# Patient Record
Sex: FEMALE | Race: OTHER | URBAN - METROPOLITAN AREA
[De-identification: names, ages, dates, MRNs, and addresses within clinical notes are randomized per-mention and may not be internally consistent; named-entity substitution may affect disease eponyms.]

---

## 2021-07-08 ENCOUNTER — OFFICE VISIT (OUTPATIENT)
Dept: PRIMARY CARE CLINIC | Age: 26
End: 2021-07-08
Payer: COMMERCIAL

## 2021-07-08 VITALS
WEIGHT: 177 LBS | OXYGEN SATURATION: 97 % | BODY MASS INDEX: 26.22 KG/M2 | HEIGHT: 69 IN | DIASTOLIC BLOOD PRESSURE: 68 MMHG | SYSTOLIC BLOOD PRESSURE: 122 MMHG | HEART RATE: 77 BPM

## 2021-07-08 DIAGNOSIS — L98.9 FOOT LESION: ICD-10-CM

## 2021-07-08 DIAGNOSIS — R21 RASH AND NONSPECIFIC SKIN ERUPTION: Primary | ICD-10-CM

## 2021-07-08 PROCEDURE — 1036F TOBACCO NON-USER: CPT | Performed by: NURSE PRACTITIONER

## 2021-07-08 PROCEDURE — G8419 CALC BMI OUT NRM PARAM NOF/U: HCPCS | Performed by: NURSE PRACTITIONER

## 2021-07-08 PROCEDURE — G8427 DOCREV CUR MEDS BY ELIG CLIN: HCPCS | Performed by: NURSE PRACTITIONER

## 2021-07-08 PROCEDURE — 99203 OFFICE O/P NEW LOW 30 MIN: CPT | Performed by: NURSE PRACTITIONER

## 2021-07-08 RX ORDER — IBUPROFEN 200 MG
200 TABLET ORAL EVERY 6 HOURS PRN
COMMUNITY

## 2021-07-08 RX ORDER — LEVONORGESTREL / ETHINYL ESTRADIOL AND ETHINYL ESTRADIOL 150-30(84)
KIT ORAL
COMMUNITY
End: 2021-08-23 | Stop reason: SDUPTHER

## 2021-07-08 SDOH — ECONOMIC STABILITY: FOOD INSECURITY: WITHIN THE PAST 12 MONTHS, THE FOOD YOU BOUGHT JUST DIDN'T LAST AND YOU DIDN'T HAVE MONEY TO GET MORE.: NEVER TRUE

## 2021-07-08 SDOH — ECONOMIC STABILITY: FOOD INSECURITY: WITHIN THE PAST 12 MONTHS, YOU WORRIED THAT YOUR FOOD WOULD RUN OUT BEFORE YOU GOT MONEY TO BUY MORE.: NEVER TRUE

## 2021-07-08 ASSESSMENT — PATIENT HEALTH QUESTIONNAIRE - PHQ9
SUM OF ALL RESPONSES TO PHQ QUESTIONS 1-9: 0
SUM OF ALL RESPONSES TO PHQ9 QUESTIONS 1 & 2: 0
1. LITTLE INTEREST OR PLEASURE IN DOING THINGS: 0
SUM OF ALL RESPONSES TO PHQ QUESTIONS 1-9: 0
2. FEELING DOWN, DEPRESSED OR HOPELESS: 0
SUM OF ALL RESPONSES TO PHQ QUESTIONS 1-9: 0

## 2021-07-08 ASSESSMENT — ENCOUNTER SYMPTOMS
SHORTNESS OF BREATH: 0
CHEST TIGHTNESS: 0

## 2021-07-08 ASSESSMENT — SOCIAL DETERMINANTS OF HEALTH (SDOH): HOW HARD IS IT FOR YOU TO PAY FOR THE VERY BASICS LIKE FOOD, HOUSING, MEDICAL CARE, AND HEATING?: NOT HARD AT ALL

## 2021-07-08 NOTE — PROGRESS NOTES
PROGRESS NOTE  Date of Service:  7/8/2021  Address: [address], [address]  Dept: [phone]  Loc: [phone]    Subjective:      Patient ID: <[id number]>  Cindy Lugo is a 27 y.o. female    HPI: The patient presents today to establish primary care. 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 Skin eruption: She developed a dry patch along the corner of her mouth right side approximately 2 weeks ago. She was testing out a new skin product that had a fragrance in it at work on her and shortly after the dry patch appeared. She stopped using the product 1 week later after stopping the product. She has tried CeraVue cream on the area. Cutaneous foot lesion: She has a hard-like callous on the bottom of her right and left foot that has been there since college 2016. She does a lot of standing at work and has developed pain 2 months ago on the left foot. She has tried over-the-counter band-aid wart removal that helped but returned. She has to wear closed-toe shoes while at work and her feet gets sweaty. Review of Systems   Constitutional: Negative for chills, fatigue and fever. Respiratory: Negative for chest tightness and shortness of breath. Cardiovascular: Negative for chest pain and palpitations. Skin: Positive for rash (perioral (right)). All other systems reviewed and are negative. Objective:   Physical Exam  Vitals reviewed. Constitutional:       Appearance: Normal appearance. HENT:      Mouth/Throat:      Comments: Dry non-erythematous patch perioral (right)  Cardiovascular:      Rate and Rhythm: Normal rate and regular rhythm. Pulses: Normal pulses. Heart sounds: Normal heart sounds. Pulmonary:      Effort: Pulmonary effort is normal.      Breath sounds: Normal breath sounds.    Musculoskeletal:        Feet:    Feet:      Right foot: Skin integrity: Callus present. Left foot:      Skin integrity: Callus present. Comments: cutanous skin lesion (bilateral)   Skin:     Capillary Refill: Capillary refill takes less than 2 seconds. Neurological:      General: No focal deficit present. Mental Status: She is alert and oriented to person, place, and time. Psychiatric:         Mood and Affect: Mood normal.         Behavior: Behavior normal.         Thought Content: Thought content normal.         Judgment: Judgment normal.           Plan:   1. Rash and nonspecific skin eruption  I suspect the patient has developed a form of contact dermatitis right side perioral area 2 weeks after using a new skin care product that has been development with her job. She stopped using the skin care product after 1 week of use and has been using CeraVue at the area. It has not worsened or progressed to other areas of the skin. I have recommended 2.5% of hydrocortisone for the area, but the patient would like to hold off for now. I think if she gives it some more time and with continued use of the CeraVue to keep the area moisturized the dermatitis should resolve on its own. 2. Foot lesion  The patient has a cutaneous skin lesion one on each of the bottom of her feet. They have been there since she had been college in 2016 and recently over the past 2 months she has noticed the lesion on her right foot has become more tender to the touch. She has tried over-the-counter wart removal with some success but eventually the skin lesion returned. I suspect the area is more of a callous than a plantar wart based on where the lesions are located on each foot and appears the cause could be pressure related from her shoes. I will send her to Podiatry for evaluation and further recommendation.     - MELBA - Evelio Briggs., DPM, Podiatry, [place]      The patient is to follow up as needed or sooner if needed.                Electronically signed by Magdalena Hogue Jenny Milligan CNP on 7/8/21 at 3:04 PM EDT     This dictation was generated by voice recognition computer software. Although all attempts are made to edit the dictation for accuracy, there may be errors in the transcription that were not intended.

## 2021-08-23 RX ORDER — LEVONORGESTREL / ETHINYL ESTRADIOL AND ETHINYL ESTRADIOL 150-30(84)
1 KIT ORAL DAILY
Qty: 91 TABLET | Refills: 0 | Status: SHIPPED | OUTPATIENT
Start: 2021-08-23 | End: 2021-08-25 | Stop reason: SDUPTHER

## 2021-08-23 NOTE — TELEPHONE ENCOUNTER
Pt called requesting a refill on birth control:    Levonorgest-Eth Estrad 91-Day 0.15-0.03 &0.01 MG TABS       [facility] [address] [place], [place] - [address] [place] [address] [address] [facility] [address] [facility] [address]   Phone:  [phone]  Fax:  [phone]

## 2021-08-23 NOTE — TELEPHONE ENCOUNTER
Medication:   Requested Prescriptions     Pending Prescriptions Disp Refills    Levonorgest-Eth Estrad 91-Day 0.15-0.03 &0.01 MG TABS 91 tablet 0     Sig: Take 1 tablet by mouth daily        Last Filled:  Historically listed    Patient Phone Number: [phone] (home)     Last appt: 7/8/2021   Next appt: Visit date not found    Last OARRS: No flowsheet data found.

## 2021-08-25 DIAGNOSIS — B37.9 YEAST INFECTION: Primary | ICD-10-CM

## 2021-08-25 RX ORDER — LEVONORGESTREL / ETHINYL ESTRADIOL AND ETHINYL ESTRADIOL 150-30(84)
1 KIT ORAL DAILY
Qty: 91 TABLET | Refills: 3 | Status: SHIPPED | OUTPATIENT
Start: 2021-08-25 | End: 2021-11-22 | Stop reason: SDUPTHER

## 2021-08-25 NOTE — TELEPHONE ENCOUNTER
Sent yeast medication to pharmacy if not improved need to see in office.  Please read directions to  medications, also sent in birth control

## 2021-08-25 NOTE — TELEPHONE ENCOUNTER
Call patient regarding phone message from [facility] ([employer]) regarding yeast infection - get more information from patient before routing to provider.

## 2021-08-25 NOTE — TELEPHONE ENCOUNTER
This refill has already been done- I thought I had cancelled the RX refill note? I know nothing about yeast infection?

## 2021-08-25 NOTE — TELEPHONE ENCOUNTER
----- Message from Marisol Baird sent at 8/25/2021  8:59 AM EDT -----  Subject: Appointment Request    Reason for Call: Urgent Adult Urinary Problem    QUESTIONS  Type of Appointment? Established Patient  Reason for appointment request? Available appointments did not meet   patient need  Additional Information for Provider? Pt called to advise she is   experiencing yeast infection symptoms. Offered to schedule appt, pt   preferred message be sent to KARLA Bull to request RX be sent to [facility] in   [place] if possible.  ---------------------------------------------------------------------------  --------------  [address]  What is the best way for the office to contact you? OK to leave message on   voicemail  Preferred Call Back Phone Number? [phone]  ---------------------------------------------------------------------------  --------------  SCRIPT ANSWERS  Relationship to Patient? Self  Are you having severe back pain with your urinary symptoms? No  Are you having vomiting or nausea? No  Is there blood in your urine? No  Are you having fevers (100.4), chills, or sweats? No  Have you recently (14 days) seen a provider for this issue? No  Have you been diagnosed with, awaiting test results for, or told that you   are suspected of having COVID-19 (Coronavirus)? (If patient has tested   negative or was tested as a requirement for work, school, or travel and   not based on symptoms, answer no)? No  Do you currently have flu-like symptoms including fever or chills, cough,   shortness of breath, difficulty breathing, or new loss of taste or smell? No  Have you had close contact with someone with COVID-19 in the last 14 days? No  (Service Expert  click yes below to proceed with [facility] As Usual   Scheduling)?  Yes

## 2021-08-25 NOTE — TELEPHONE ENCOUNTER
Patient states this is her 2nd request for this refill since Monday. Medication:   Requested Prescriptions     Pending Prescriptions Disp Refills    Levonorgest-Eth Estrad 91-Day 0.15-0.03 &0.01 MG TABS 91 tablet 0     Sig: Take 1 tablet by mouth daily        Last Filled:  08/23/2021 #91    Patient Phone Number: [phone] (home)     Last appt: 7/8/2021   Next appt: Visit date not found    Last OARRS: No flowsheet data found.

## 2021-08-25 NOTE — TELEPHONE ENCOUNTER
Regarding yeast infection:   On her cycle  Exercises regularly  A/C out in car  Only uses pads  Itching, swollen labia, no yeast discharge

## 2021-08-26 DIAGNOSIS — B37.9 YEAST INFECTION: ICD-10-CM

## 2021-08-26 NOTE — PROGRESS NOTES
[facility] on 22&3 out of medication- can you please send to [facility] in [place] on [address]?

## 2021-08-26 NOTE — TELEPHONE ENCOUNTER
Pili states that the other kkroger on 22 [address]3 is out of medication- can you please send to the [address]?

## 2021-08-30 ENCOUNTER — OFFICE VISIT (OUTPATIENT)
Dept: PRIMARY CARE CLINIC | Age: 26
End: 2021-08-30
Payer: MEDICARE

## 2021-08-30 VITALS
RESPIRATION RATE: 16 BRPM | BODY MASS INDEX: 25.56 KG/M2 | HEIGHT: 69 IN | TEMPERATURE: 99 F | OXYGEN SATURATION: 97 % | DIASTOLIC BLOOD PRESSURE: 58 MMHG | WEIGHT: 172.6 LBS | SYSTOLIC BLOOD PRESSURE: 104 MMHG | HEART RATE: 80 BPM

## 2021-08-30 DIAGNOSIS — Z11.4 SCREENING FOR HIV (HUMAN IMMUNODEFICIENCY VIRUS): ICD-10-CM

## 2021-08-30 DIAGNOSIS — N89.8 VAGINAL ITCHING: Primary | ICD-10-CM

## 2021-08-30 DIAGNOSIS — Z11.3 SCREENING EXAMINATION FOR STD (SEXUALLY TRANSMITTED DISEASE): ICD-10-CM

## 2021-08-30 DIAGNOSIS — Z11.59 NEED FOR HEPATITIS C SCREENING TEST: ICD-10-CM

## 2021-08-30 PROCEDURE — 99213 OFFICE O/P EST LOW 20 MIN: CPT | Performed by: NURSE PRACTITIONER

## 2021-08-30 RX ORDER — FLUCONAZOLE 150 MG/1
TABLET ORAL
Qty: 2 TABLET | Refills: 0 | Status: SHIPPED | OUTPATIENT
Start: 2021-08-30

## 2021-08-30 ASSESSMENT — PATIENT HEALTH QUESTIONNAIRE - PHQ9
1. LITTLE INTEREST OR PLEASURE IN DOING THINGS: 0
SUM OF ALL RESPONSES TO PHQ QUESTIONS 1-9: 0
SUM OF ALL RESPONSES TO PHQ9 QUESTIONS 1 & 2: 0
SUM OF ALL RESPONSES TO PHQ QUESTIONS 1-9: 0
2. FEELING DOWN, DEPRESSED OR HOPELESS: 0
SUM OF ALL RESPONSES TO PHQ QUESTIONS 1-9: 0

## 2021-08-30 NOTE — PROGRESS NOTES
PROGRESS NOTE  Date of Service:  8/30/2021  Address: [address], [address] [place]  Dept: [phone]  Loc: [phone]    Subjective:      Patient ID: <[id number]>  Trang Mims is a 27 y.o. female    HPI: patient is here for vaginal itching, patient said that she could have discharged. Patient said that she tried monistat with itching. Patient does feel that she got it from wearing a pad, she said that she was hot in her car. Review of Systems   Constitutional: Negative for chills, fatigue and fever. Genitourinary: Positive for vaginal discharge. Negative for vaginal bleeding and vaginal pain. Vaginal itching     All other systems reviewed and are negative. Objective:   Physical Exam  Vitals reviewed. Constitutional:       Appearance: Normal appearance. Genitourinary:     Labia:         Right: Rash present. Left: Rash present. Comments: Red and irritated   Skin:     Capillary Refill: Capillary refill takes less than 2 seconds. Neurological:      General: No focal deficit present. Mental Status: She is alert and oriented to person, place, and time. Psychiatric:         Mood and Affect: Mood normal.         Behavior: Behavior normal.         Thought Content: Thought content normal.         Judgment: Judgment normal.           Plan:   1. Screening for HIV (human immunodeficiency virus)  - HIV Screen    2. Need for hepatitis C screening test    3. Screening examination for STD (sexually transmitted disease)  - C.trachomatis N.gonorrhoeae DNA, Urine    4. Vaginal itching  -Suspect patient does have yeast not rule out UV or STD. Low suspicion for STD. Will treat patient for yeast.  Patient's tried Monistat did not work. Will try Diflucan. Patient understands if she does have bacterial vaginosis will start on Flagyl and she cannot drink alcohol when on this medicine.   Will get results back later this week. - VAGINAL PATHOGENS PROBE *A  - fluconazole (DIFLUCAN) 150 MG tablet; 1 tablet once then repeat in 3 days if symptoms continue  Dispense: 2 tablet; Refill: 0  - C.trachomatis N.gonorrhoeae DNA, Urine                       Electronically signed by DOYLE Dhillon CNP on 8/30/21 at 2:46 PM EDT     This dictation was generated by voice recognition computer software. Although all attempts are made to edit the dictation for accuracy, there may be errors in the transcription that were not intended.

## 2021-08-31 LAB
CANDIDA SPECIES, DNA PROBE: ABNORMAL
GARDNERELLA VAGINALIS, DNA PROBE: ABNORMAL
HEPATITIS C ANTIBODY INTERPRETATION: NORMAL
HIV AG/AB: NORMAL
HIV ANTIGEN: NORMAL
HIV-1 ANTIBODY: NORMAL
HIV-2 AB: NORMAL
TOTAL SYPHILLIS IGG/IGM: NORMAL
TRICHOMONAS VAGINALIS DNA: ABNORMAL

## 2021-09-01 ENCOUNTER — TELEPHONE (OUTPATIENT)
Dept: PRIMARY CARE CLINIC | Age: 26
End: 2021-09-01

## 2021-09-01 RX ORDER — METRONIDAZOLE 500 MG/1
500 TABLET ORAL 3 TIMES DAILY
Qty: 30 TABLET | Refills: 0 | Status: SHIPPED | OUTPATIENT
Start: 2021-09-01 | End: 2021-09-11

## 2021-09-01 NOTE — TELEPHONE ENCOUNTER
----- Message from Isaacleno Vicente sent at 9/1/2021  9:06 AM EDT -----  Subject: Message to Provider    QUESTIONS  Information for Provider? Patient got test results on [employer], wanting to   see if she needs an RX? She uses [facility] [phone]  ---------------------------------------------------------------------------  --------------  Saeid SALDANA  What is the best way for the office to contact you? OK to leave message on   voicemail  Preferred Call Back Phone Number? [phone]  ---------------------------------------------------------------------------  --------------  SCRIPT ANSWERS  Relationship to Patient?  Self

## 2021-09-01 NOTE — TELEPHONE ENCOUNTER
GARDNERELLA VAGINALIS, DNA PROBE Abnormal  08/30/2021  3:05 PM  - [facility]   POSITIVE DNA Probe detected. Normal range: Negative DNA not detected   Patient saw this in [address] and was asking if she need mediation for it?

## 2021-09-02 LAB
C. TRACHOMATIS DNA ,URINE: NEGATIVE
N. GONORRHOEAE DNA, URINE: NEGATIVE

## 2021-11-22 RX ORDER — LEVONORGESTREL / ETHINYL ESTRADIOL AND ETHINYL ESTRADIOL 150-30(84)
1 KIT ORAL DAILY
Qty: 91 TABLET | Refills: 3 | Status: SHIPPED | OUTPATIENT
Start: 2021-11-22

## 2021-11-22 NOTE — TELEPHONE ENCOUNTER
Medication:   Requested Prescriptions     Pending Prescriptions Disp Refills    Levonorgest-Eth Estrad 91-Day 0.15-0.03 &0.01 MG TABS 91 tablet 3     Sig: Take 1 tablet by mouth daily        Last Filled:  [id number]    Patient Phone Number: [phone] (home)     Last appt: 8/30/2021   Next appt: Visit date not found    Last OARRS: No flowsheet data found.

## 2022-06-23 ENCOUNTER — OFFICE VISIT (OUTPATIENT)
Dept: URGENT CARE | Age: 27
End: 2022-06-23
Payer: COMMERCIAL

## 2022-06-23 VITALS
BODY MASS INDEX: 24.42 KG/M2 | RESPIRATION RATE: 13 BRPM | SYSTOLIC BLOOD PRESSURE: 116 MMHG | OXYGEN SATURATION: 97 % | DIASTOLIC BLOOD PRESSURE: 78 MMHG | TEMPERATURE: 97.4 F | WEIGHT: 170.6 LBS | HEART RATE: 74 BPM | HEIGHT: 70 IN

## 2022-06-23 DIAGNOSIS — R07.89 CHEST HEAVINESS: Primary | ICD-10-CM

## 2022-06-23 PROCEDURE — G8427 DOCREV CUR MEDS BY ELIG CLIN: HCPCS

## 2022-06-23 PROCEDURE — G8420 CALC BMI NORM PARAMETERS: HCPCS

## 2022-06-23 PROCEDURE — 99203 OFFICE O/P NEW LOW 30 MIN: CPT

## 2022-06-23 PROCEDURE — 1036F TOBACCO NON-USER: CPT

## 2022-06-23 NOTE — PROGRESS NOTES
[facility] (: 1995) is a 24 y.o. female here for evaluation of the following chief complaint(s):  Chest Pain (COVID POSITIVE 22)      SUBJECTIVE:  HPI  Pt presents today with c/o \"chest heaviness\" that began shortly after her diagnosis of Covid 4 days ago. Pt notes the majority of her other symptoms have improved. Pt denies any shortness of breath or pain radiating to her neck, arm, shoulders or back. Pt notes the pain is at its worst when she is lying down. She states she has been taking OTC dayquil/nyquill with some relief. Review of Systems   Constitutional: Negative for activity change, chills, fatigue and fever. HENT: Negative for congestion, ear pain, sinus pressure and sinus pain. Respiratory: Positive for chest tightness. Negative for cough and shortness of breath. Cardiovascular: Negative for chest pain and palpitations. Gastrointestinal: Negative. Musculoskeletal: Negative. Skin: Negative. Neurological: Negative. OBJECTIVE:  /78   Pulse 74   Temp 97.4 °F (36.3 °C)   Resp 13   Ht 5' 10\" (1.778 m)   Wt 170 lb 9.6 oz (77.4 kg)   SpO2 97%   BMI 24.48 kg/m²    Physical Exam  Vitals reviewed. Constitutional:       Appearance: Normal appearance. She is normal weight. Comments: Carrying on conversation without difficulty   HENT:      Head: Normocephalic. Right Ear: Tympanic membrane normal.      Left Ear: Tympanic membrane normal.      Nose: Nose normal.      Mouth/Throat:      Mouth: Mucous membranes are moist.      Pharynx: Oropharynx is clear. Eyes:      Conjunctiva/sclera: Conjunctivae normal.      Pupils: Pupils are equal, round, and reactive to light. Cardiovascular:      Rate and Rhythm: Normal rate and regular rhythm. Pulses: Normal pulses. Heart sounds: Normal heart sounds. Pulmonary:      Effort: Pulmonary effort is normal.      Breath sounds: Normal breath sounds.    Abdominal:      General: Bowel sounds are normal.      Palpations: Abdomen is soft. Musculoskeletal:         General: Normal range of motion. Cervical back: Normal range of motion. Skin:     General: Skin is warm and dry. Neurological:      General: No focal deficit present. Mental Status: She is alert and oriented to person, place, and time. Mental status is at baseline. Psychiatric:         Mood and Affect: Mood normal.         Behavior: Behavior normal.         Thought Content: Thought content normal.         Judgment: Judgment normal.         ASSESSMENT:  1. Chest heaviness  -     XR CHEST STANDARD (2 VW)  Discussed s/s of covid that may persist.  Offered CXR. Pt wishes to see if symptoms improve before proceeding with X-Ray. PLAN:  Encouraged deep breathing exercises  OTC medications to treat symptoms. Obtain rest.  Maintain hydration. Wash hands often with soap and water. Avoid smoke and other irritants. Wear face covering in public and follow social distancing recommendations. Discussed precautions and indications for returning to clinic. Discussed precautions and indications for seeking ED care. Recommend self quarantine for  5 days from symptom onset or positive test result and fever free for 24 hours      No follow-ups on file.      Electronically signed by DOYLE Melendrez CNP on 6/23/2022 at 7:51 AM.

## 2022-06-23 NOTE — PATIENT INSTRUCTIONS
OTC medications to treat symptoms. Obtain rest.  Maintain hydration. Wash hands often with soap and water. Avoid smoke and other irritants. Wear face covering in public and follow social distancing recommendations. Discussed precautions and indications for returning to clinic. Discussed precautions and indications for seeking ED care. Recommend self quarantine for  5 days from symptom onset or positive test result and fever free for 24 hours      Patient Education        10 Things to Do When You Have COVID-19      Talk to your doctor right away about treatment. They might have you take medicine to help prevent serious illness. Stay home. Don't go to school, work, or public areas. And don't use public transportation, ride-shares, or taxis unless you have no choice. Leave your home only if you need to get medical care. But call the doctor's office firstso they know you're coming. And wear a well-fitting mask. Ask before leaving isolation. Follow your doctor's advice about when it is safe for you to leave isolation. If you have questions, go to the [facility] website at cdc.gov to check the [employer]. Wear a well-fitting mask when you are around other people. It can help stop the spread of the virus. Limit contact with people in your home. If possible, stay in a separate bedroom and use a separate bathroom. Cover your mouth and nose with a tissue when you cough or sneeze. Then throw the tissue in the trash right away. Wash your hands often, especially after you cough or sneeze. Use soap and water, and scrub for at least 20 seconds. If soap and wateraren't available, use an alcohol-based hand . Don't share personal household items. These include bedding, towels, cups and glasses, and eating utensils. Clean and disinfect your home every day. Use household  or disinfectant wipes or sprays.      If needed, take acetaminophen (Tylenol) or ibuprofen (Advil, Motrin) to relieve fever and body aches. Read and follow all instructions on the label. Current as of: May 28, 2022               Content Version: 13.3  © 2006-2022 Healthwise, Incorporated. Care instructions adapted under license by [facility] ([facility]). If you have questions about a medical condition or this instruction, always ask your healthcare professional. [place] [address] any warranty or liability for your use of this information.

## 2022-06-24 ASSESSMENT — ENCOUNTER SYMPTOMS
GASTROINTESTINAL NEGATIVE: 1
SINUS PRESSURE: 0
SINUS PAIN: 0
COUGH: 0
SHORTNESS OF BREATH: 0
CHEST TIGHTNESS: 1

## 2023-02-16 RX ORDER — LEVONORGESTREL / ETHINYL ESTRADIOL AND ETHINYL ESTRADIOL 150-30(84)
1 KIT ORAL DAILY
Qty: 91 TABLET | Refills: 3 | OUTPATIENT
Start: 2023-02-16

## 2023-02-17 ENCOUNTER — OFFICE VISIT (OUTPATIENT)
Dept: PRIMARY CARE CLINIC | Age: 28
End: 2023-02-17
Payer: COMMERCIAL

## 2023-02-17 VITALS
DIASTOLIC BLOOD PRESSURE: 66 MMHG | HEART RATE: 103 BPM | WEIGHT: 178.4 LBS | BODY MASS INDEX: 25.6 KG/M2 | SYSTOLIC BLOOD PRESSURE: 106 MMHG | OXYGEN SATURATION: 99 % | TEMPERATURE: 97.8 F

## 2023-02-17 DIAGNOSIS — N94.6 PAINFUL MENSTRUAL PERIODS: Primary | ICD-10-CM

## 2023-02-17 PROCEDURE — G8427 DOCREV CUR MEDS BY ELIG CLIN: HCPCS | Performed by: NURSE PRACTITIONER

## 2023-02-17 PROCEDURE — G8419 CALC BMI OUT NRM PARAM NOF/U: HCPCS | Performed by: NURSE PRACTITIONER

## 2023-02-17 PROCEDURE — G8484 FLU IMMUNIZE NO ADMIN: HCPCS | Performed by: NURSE PRACTITIONER

## 2023-02-17 PROCEDURE — 99213 OFFICE O/P EST LOW 20 MIN: CPT | Performed by: NURSE PRACTITIONER

## 2023-02-17 PROCEDURE — 1036F TOBACCO NON-USER: CPT | Performed by: NURSE PRACTITIONER

## 2023-02-17 RX ORDER — LEVONORGESTREL / ETHINYL ESTRADIOL AND ETHINYL ESTRADIOL 150-30(84)
1 KIT ORAL DAILY
Qty: 91 TABLET | Refills: 3 | Status: SHIPPED | OUTPATIENT
Start: 2023-02-17

## 2023-02-17 SDOH — ECONOMIC STABILITY: TRANSPORTATION INSECURITY
IN THE PAST 12 MONTHS, HAS LACK OF TRANSPORTATION KEPT YOU FROM MEETINGS, WORK, OR FROM GETTING THINGS NEEDED FOR DAILY LIVING?: NO

## 2023-02-17 SDOH — ECONOMIC STABILITY: FOOD INSECURITY: WITHIN THE PAST 12 MONTHS, YOU WORRIED THAT YOUR FOOD WOULD RUN OUT BEFORE YOU GOT MONEY TO BUY MORE.: NEVER TRUE

## 2023-02-17 SDOH — ECONOMIC STABILITY: FOOD INSECURITY: WITHIN THE PAST 12 MONTHS, THE FOOD YOU BOUGHT JUST DIDN'T LAST AND YOU DIDN'T HAVE MONEY TO GET MORE.: NEVER TRUE

## 2023-02-17 SDOH — ECONOMIC STABILITY: INCOME INSECURITY: HOW HARD IS IT FOR YOU TO PAY FOR THE VERY BASICS LIKE FOOD, HOUSING, MEDICAL CARE, AND HEATING?: NOT HARD AT ALL

## 2023-02-17 SDOH — ECONOMIC STABILITY: HOUSING INSECURITY
IN THE LAST 12 MONTHS, WAS THERE A TIME WHEN YOU DID NOT HAVE A STEADY PLACE TO SLEEP OR SLEPT IN A SHELTER (INCLUDING NOW)?: NO

## 2023-02-17 ASSESSMENT — ENCOUNTER SYMPTOMS
CHOKING: 0
SHORTNESS OF BREATH: 0
COUGH: 0
WHEEZING: 0

## 2023-02-17 ASSESSMENT — PATIENT HEALTH QUESTIONNAIRE - PHQ9
SUM OF ALL RESPONSES TO PHQ QUESTIONS 1-9: 0
SUM OF ALL RESPONSES TO PHQ QUESTIONS 1-9: 0
2. FEELING DOWN, DEPRESSED OR HOPELESS: 0
SUM OF ALL RESPONSES TO PHQ QUESTIONS 1-9: 0
SUM OF ALL RESPONSES TO PHQ QUESTIONS 1-9: 0
SUM OF ALL RESPONSES TO PHQ9 QUESTIONS 1 & 2: 0
1. LITTLE INTEREST OR PLEASURE IN DOING THINGS: 0

## 2023-02-17 NOTE — PROGRESS NOTES
PROGRESS NOTE  Date of Service:  2/17/2023  Address: [address], [address]  Dept: [phone]  Loc: [phone]    Subjective:      Patient ID: [id number]  June Dubon is a 24 y.o. female    HPI: patient is here for birth control refills, patient said periods are very light. Patient said she bleed for 3-4 days. Patient said her pain has not changed much. Patient is getting exercise she said 3 times a week. Review of Systems   Constitutional:  Negative for chills, fatigue and fever. Respiratory:  Negative for cough, choking, shortness of breath and wheezing. Cardiovascular:  Negative for chest pain, palpitations and leg swelling. Psychiatric/Behavioral:  Negative for self-injury, sleep disturbance and suicidal ideas. The patient is not nervous/anxious and is not hyperactive. All other systems reviewed and are negative. Objective:   Physical Exam  Vitals reviewed. Constitutional:       Appearance: Normal appearance. Cardiovascular:      Rate and Rhythm: Normal rate and regular rhythm. Pulses: Normal pulses. Heart sounds: Normal heart sounds. Pulmonary:      Effort: Pulmonary effort is normal.      Breath sounds: Normal breath sounds. Skin:     Capillary Refill: Capillary refill takes less than 2 seconds. Neurological:      General: No focal deficit present. Mental Status: She is alert and oriented to person, place, and time. Psychiatric:         Mood and Affect: Mood normal.         Behavior: Behavior normal.         Thought Content: Thought content normal.         Judgment: Judgment normal.       Plan:   1. Painful menstrual periods-patient is doing well on birth control will continue. Refer patient to gyno for pap.    -     Levonorgest-Eth Estrad 91-Day 0.15-0.03 &0.01 MG TABS; Take 1 tablet by mouth daily, Disp-91 tablet, R-3Normal  -     AFL - Tio Ding MD, Obstetrics, [employer]           Electronically signed by DOYLE Martines CNP on 2/17/23 at 9:32 AM EST     This dictation was generated by voice recognition computer software. Although all attempts are made to edit the dictation for accuracy, there may be errors in the transcription that were not intended.

## 2023-05-05 ENCOUNTER — TELEPHONE (OUTPATIENT)
Dept: PRIMARY CARE CLINIC | Age: 28
End: 2023-05-05

## 2023-05-05 NOTE — TELEPHONE ENCOUNTER
Called pt to advise per pcp, pt should call OB/GYN pt was referred to for PAP smear.   PCP advises pts in child bearing ages have an established relationship/care with specialist.  Pt has appt on 5/10 for PAP smear with PCP, pt needs to schedule this with GYN pcp referred pt to - VM left for pt to call back

## 2023-05-10 ENCOUNTER — OFFICE VISIT (OUTPATIENT)
Dept: PRIMARY CARE CLINIC | Age: 28
End: 2023-05-10
Payer: COMMERCIAL

## 2023-05-10 VITALS
TEMPERATURE: 98.4 F | BODY MASS INDEX: 25.91 KG/M2 | DIASTOLIC BLOOD PRESSURE: 68 MMHG | RESPIRATION RATE: 18 BRPM | HEIGHT: 70 IN | WEIGHT: 181 LBS | OXYGEN SATURATION: 97 % | SYSTOLIC BLOOD PRESSURE: 102 MMHG | HEART RATE: 76 BPM

## 2023-05-10 DIAGNOSIS — N89.8 VAGINAL DISCHARGE: Primary | ICD-10-CM

## 2023-05-10 PROCEDURE — 1036F TOBACCO NON-USER: CPT | Performed by: NURSE PRACTITIONER

## 2023-05-10 PROCEDURE — 99214 OFFICE O/P EST MOD 30 MIN: CPT | Performed by: NURSE PRACTITIONER

## 2023-05-10 PROCEDURE — G8419 CALC BMI OUT NRM PARAM NOF/U: HCPCS | Performed by: NURSE PRACTITIONER

## 2023-05-10 PROCEDURE — G8427 DOCREV CUR MEDS BY ELIG CLIN: HCPCS | Performed by: NURSE PRACTITIONER

## 2023-05-10 ASSESSMENT — ENCOUNTER SYMPTOMS
WHEEZING: 0
SHORTNESS OF BREATH: 0
COUGH: 0

## 2023-05-10 NOTE — PROGRESS NOTES
PROGRESS NOTE  Date of Service:  5/10/2023  Address: [address], [address]  Dept: [phone]  Loc: [phone]    Subjective:      Patient ID: [id number]  Juliet Adjutant is a 34 y.o. female    HPI: patient is here with vaginal discharge, she said she is going on week three of brown discharge. Patient denies vaginal odor or vaginal itching. Patient said that she does not have a new partner and she said that she has normal regular periods. Patient said she is having the discharge every day. Patient did have BV in the past which was itchy. Patient partner is not having any symptoms. Patient denies having pain during intercourse, no pain prior. Patient denies pregnancy 2 negative test.     Review of Systems   Constitutional:  Negative for chills, fatigue and fever. Respiratory:  Negative for cough, shortness of breath and wheezing. Genitourinary:  Positive for vaginal discharge. Negative for decreased urine volume, flank pain, frequency, genital sores and vaginal bleeding. All other systems reviewed and are negative. Objective:   Physical Exam  Vitals reviewed. Exam conducted with a chaperone present. Constitutional:       Appearance: Normal appearance. Cardiovascular:      Rate and Rhythm: Normal rate and regular rhythm. Pulses: Normal pulses. Heart sounds: Normal heart sounds. Pulmonary:      Effort: Pulmonary effort is normal.      Breath sounds: Normal breath sounds. Genitourinary:     Cervix: Discharge, friability, lesion, erythema and cervical bleeding present. Skin:     Capillary Refill: Capillary refill takes less than 2 seconds. Neurological:      General: No focal deficit present. Mental Status: She is alert and oriented to person, place, and time. Psychiatric:         Mood and Affect: Mood normal.         Behavior: Behavior normal.         Thought Content:  Thought content

## 2023-05-11 LAB
CANDIDA DNA VAG QL NAA+PROBE: NORMAL
G VAGINALIS DNA SPEC QL NAA+PROBE: NORMAL
HPV16+18+H RISK 12 DNA SPEC-IMP: NORMAL
T VAGINALIS DNA VAG QL NAA+PROBE: NORMAL

## 2023-05-16 LAB
C TRACH DNA CVX QL NAA+PROBE: NEGATIVE
HPV HR 12 DNA SPEC QL NAA+PROBE: NOT DETECTED
HPV16 DNA SPEC QL NAA+PROBE: NOT DETECTED
HPV16+18+H RISK 12 DNA SPEC-IMP: NORMAL
HPV18 DNA SPEC QL NAA+PROBE: NOT DETECTED
N GONORRHOEA DNA SPEC QL NAA+PROBE: NEGATIVE

## 2024-02-19 DIAGNOSIS — N94.6 PAINFUL MENSTRUAL PERIODS: ICD-10-CM

## 2024-02-19 RX ORDER — LEVONORGESTREL / ETHINYL ESTRADIOL AND ETHINYL ESTRADIOL 150-30(84)
1 KIT ORAL DAILY
Qty: 91 TABLET | Refills: 0 | Status: SHIPPED | OUTPATIENT
Start: 2024-02-19

## 2024-02-19 RX ORDER — LEVONORGESTREL / ETHINYL ESTRADIOL AND ETHINYL ESTRADIOL 150-30(84)
1 KIT ORAL DAILY
Qty: 30 TABLET | Refills: 0 | OUTPATIENT
Start: 2024-02-19

## 2024-02-19 NOTE — TELEPHONE ENCOUNTER
I will send in 1 more, she needs appointment or ask her if she is seeing obgyn, I recommended her see one in may

## 2024-02-19 NOTE — TELEPHONE ENCOUNTER
Medication:   Requested Prescriptions     Pending Prescriptions Disp Refills    Levonorgest-Eth Estrad 91-Day 0.15-0.03 &0.01 MG TABS 91 tablet 3     Sig: Take 1 tablet by mouth daily       Last Filled:  [id number] LM for appointment Short RX pended.    Patient Phone Number: [phone] (home)     Last appt: 5/10/2023   Next appt:   Return in about 1 year (around 2/17/2024) for birth control .    Last Thyroid: No results found for: \"TSH\", \"FT3\", \"V6LXGKO\", \"T4FREE\", \"W1HHSYG\"

## 2024-02-19 NOTE — TELEPHONE ENCOUNTER
Medication:   Requested Prescriptions     Pending Prescriptions Disp Refills    JAIMIESS 0.15-0.03 &0.01 MG TABS [Pharmacy Med Name: JAIMIESS 0.15-0.03-0.01 MG TAB] 91 tablet 3     Sig: TAKE ONE TABLET BY MOUTH DAILY        Last Filled:  never?    Patient Phone Number: [phone] (home)     Last appt: 5/10/2023   Next appt:   Return in about 1 year (around 2/17/2024) for birth control .    Last OARRS:        No data to display

## 2024-05-18 DIAGNOSIS — N94.6 PAINFUL MENSTRUAL PERIODS: ICD-10-CM

## 2024-05-20 RX ORDER — LEVONORGESTREL / ETHINYL ESTRADIOL AND ETHINYL ESTRADIOL 150-30(84)
1 KIT ORAL DAILY
Qty: 91 TABLET | Refills: 0 | Status: SHIPPED | OUTPATIENT
Start: 2024-05-20

## 2024-05-20 NOTE — TELEPHONE ENCOUNTER
Medication:   Requested Prescriptions     Pending Prescriptions Disp Refills    JAIMIESS 0.15-0.03 &0.01 MG TABS [Pharmacy Med Name: JAIMIESS 0.15-0.03-0.01 MG TAB] 91 tablet 0     Sig: TAKE 1 TABLET BY MOUTH DAILY        Last Filled:  2/19/2024 #91 0 refills    Patient Phone Number: [phone] (home)     Last appt: 5/10/2023   Next appt: Visit date not found    Last OARRS:        No data to display

## 2025-08-24 DIAGNOSIS — N94.6 PAINFUL MENSTRUAL PERIODS: ICD-10-CM

## 2025-08-25 RX ORDER — LEVONORGESTREL / ETHINYL ESTRADIOL AND ETHINYL ESTRADIOL 150-30(84)
1 KIT ORAL DAILY
Qty: 91 TABLET | Refills: 0 | OUTPATIENT
Start: 2025-08-25